# Patient Record
Sex: MALE | Race: WHITE | NOT HISPANIC OR LATINO | Employment: FULL TIME | URBAN - METROPOLITAN AREA
[De-identification: names, ages, dates, MRNs, and addresses within clinical notes are randomized per-mention and may not be internally consistent; named-entity substitution may affect disease eponyms.]

---

## 2018-04-24 ENCOUNTER — OFFICE VISIT (OUTPATIENT)
Dept: ALLERGY | Facility: CLINIC | Age: 23
End: 2018-04-24
Payer: COMMERCIAL

## 2018-04-24 VITALS
BODY MASS INDEX: 38.28 KG/M2 | HEIGHT: 73 IN | WEIGHT: 288.81 LBS | SYSTOLIC BLOOD PRESSURE: 138 MMHG | DIASTOLIC BLOOD PRESSURE: 78 MMHG

## 2018-04-24 DIAGNOSIS — L29.9 ITCHING: Primary | ICD-10-CM

## 2018-04-24 PROCEDURE — 99999 PR PBB SHADOW E&M-NEW PATIENT-LVL III: CPT | Mod: PBBFAC,,, | Performed by: STUDENT IN AN ORGANIZED HEALTH CARE EDUCATION/TRAINING PROGRAM

## 2018-04-24 PROCEDURE — 99203 OFFICE O/P NEW LOW 30 MIN: CPT | Mod: S$GLB,,, | Performed by: STUDENT IN AN ORGANIZED HEALTH CARE EDUCATION/TRAINING PROGRAM

## 2018-04-24 RX ORDER — LEVOCETIRIZINE DIHYDROCHLORIDE 5 MG/1
10 TABLET, FILM COATED ORAL 2 TIMES DAILY PRN
Qty: 60 TABLET | Refills: 11 | Status: SHIPPED | OUTPATIENT
Start: 2018-04-24 | End: 2019-04-24

## 2018-04-24 RX ORDER — DEXTROAMPHETAMINE SACCHARATE, AMPHETAMINE ASPARTATE MONOHYDRATE, DEXTROAMPHETAMINE SULFATE AND AMPHETAMINE SULFATE 5; 5; 5; 5 MG/1; MG/1; MG/1; MG/1
20 CAPSULE, EXTENDED RELEASE ORAL 2 TIMES DAILY
COMMUNITY

## 2018-04-24 NOTE — PROGRESS NOTES
Allergy Clinic Note  [facility]    Subjective:      Patient ID: Jose Francisco Castro is a 22 y.o. male.    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    He denies any associated welts, swelling, rash, other skin symptoms.  This is never had contact to him before.  He took 1 tablet of Zyrtec 10 mg for rhinitis symptoms but noted no improvement in his itching.    In his work at [employer], he works swing shifts.  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    He has a history of ALLERGIES and mild intermittent asthma.  Rhinitis symptoms are well controlled on Zyrtec alone.  Asthma is quiescent    Additional History:   Past medical history is significant for ADD.  He is status post PE tube placement.  Family history is noncontributory. Client   reports that he has never smoked. He has quit using smokeless tobacco. His smokeless tobacco use included Chew.  Exposures are notable for working in a ICU environment which is also currently a construction zone.      There is no problem list on file for this patient.    No current outpatient prescriptions on file prior to visit.     No current facility-administered medications on file prior to visit.          Review of Systems   Constitutional: Negative for chills and fever.   HENT: Negative for ear discharge and nosebleeds.    Eyes: Negative for discharge and redness.   Respiratory: Negative for hemoptysis, sputum production and stridor.    Gastrointestinal: Negative for blood in stool, melena and vomiting.   Genitourinary: Negative for hematuria.   Skin: Positive for itching. Negative for rash.   Neurological: Negative for seizures and loss of consciousness.       Objective:   /78 (BP Location: Left arm, Patient Position: Sitting)   Ht  "6' 1" (1.854 m)   Wt 131 kg (288 lb 12.8 oz)   BMI 38.10 kg/m²       Physical Exam    Data: none      Assessment:     1. Itching        Plan:     JoseF rancisco was seen today for itching.    Diagnoses and all orders for this visit:    Itching  Etiology unclear.  Differential diagnosis extremely wide.  Discussed natural history.  Discussed lack of utility of workup unless symptoms are prolonged.  Emphasized control of itching is the main treatment goals  As client works swing shifts, sedating antihistamines are not appropriate.  -     levocetirizine (XYZAL) 5 MG tablet; Take 2 tablets (10 mg total) by mouth 2 (two) times daily as needed for Allergies.        Patient Instructions   Xyzal = levocetirizine (5 mg tabs): 2 pills BID until itching is gone.    Return here if itching is not controlled on above  Or if problems lasst more than 6 weeks.      Follow-up if symptoms worsen or fail to improve.    Jaja Cary MD      "

## 2018-04-24 NOTE — PATIENT INSTRUCTIONS
Xyzal = levocetirizine (5 mg tabs): 2 pills BID until itching is gone.    Return here if itching is not controlled on above  Or if problems lasst more than 6 weeks.